# Patient Record
Sex: FEMALE | Race: BLACK OR AFRICAN AMERICAN | Employment: OTHER | URBAN - METROPOLITAN AREA
[De-identification: names, ages, dates, MRNs, and addresses within clinical notes are randomized per-mention and may not be internally consistent; named-entity substitution may affect disease eponyms.]

---

## 2022-10-24 ENCOUNTER — TELEPHONE (OUTPATIENT)
Dept: INTERNAL MEDICINE CLINIC | Age: 72
End: 2022-10-24

## 2022-10-24 NOTE — TELEPHONE ENCOUNTER
----- Message from Valery Fausto sent at 10/24/2022  1:40 PM EDT -----  Subject: Appointment Request    Reason for Call: New Patient/New to Provider Appointment needed: Routine   Medicare AWV    QUESTIONS    Reason for appointment request? Requested Provider unavailable - Va Noemi     Additional Information for Provider? Pt wants to book a NP AWV appt with   Dr. Arlin Longo. Pt states her friend, a current Pt of Dr. Jeffry London,   referred her so she would like to establish care with Dr. Jeffry London as her   PCP. Pt screened green.  Pt states her last AWV was in 9/22 or 10/22 but is   not sure when.   ---------------------------------------------------------------------------  --------------  [id number]  [phone]; OK to leave message on voicemail  ---------------------------------------------------------------------------  --------------  SCRIPT ANSWERS  COVID Screen: [facility] Patient